# Patient Record
Sex: MALE | URBAN - METROPOLITAN AREA
[De-identification: names, ages, dates, MRNs, and addresses within clinical notes are randomized per-mention and may not be internally consistent; named-entity substitution may affect disease eponyms.]

---

## 2018-08-14 ENCOUNTER — APPOINTMENT (RX ONLY)
Dept: URBAN - METROPOLITAN AREA CLINIC 26 | Facility: CLINIC | Age: 43
Setting detail: DERMATOLOGY
End: 2018-08-14

## 2018-08-14 DIAGNOSIS — T07XXXA ABRASION OR FRICTION BURN OF OTHER, MULTIPLE, AND UNSPECIFIED SITES, WITHOUT MENTION OF INFECTION: ICD-10-CM

## 2018-08-14 DIAGNOSIS — L90.5 SCAR CONDITIONS AND FIBROSIS OF SKIN: ICD-10-CM

## 2018-08-14 PROBLEM — S00.30XA UNSPECIFIED SUPERFICIAL INJURY OF NOSE, INITIAL ENCOUNTER: Status: ACTIVE | Noted: 2018-08-14

## 2018-08-14 PROBLEM — L23.7 ALLERGIC CONTACT DERMATITIS DUE TO PLANTS, EXCEPT FOOD: Status: ACTIVE | Noted: 2018-08-14

## 2018-08-14 PROBLEM — L70.0 ACNE VULGARIS: Status: ACTIVE | Noted: 2018-08-14

## 2018-08-14 PROBLEM — M12.9 ARTHROPATHY, UNSPECIFIED: Status: ACTIVE | Noted: 2018-08-14

## 2018-08-14 PROCEDURE — ? COUNSELING

## 2018-08-14 PROCEDURE — 99201: CPT

## 2018-08-14 PROCEDURE — ? PRESCRIPTION SAMPLES PROVIDED

## 2018-08-14 PROCEDURE — ? RECOMMENDATIONS

## 2018-08-14 ASSESSMENT — LOCATION SIMPLE DESCRIPTION DERM
LOCATION SIMPLE: NOSE
LOCATION SIMPLE: LEFT FOREHEAD

## 2018-08-14 ASSESSMENT — LOCATION ZONE DERM
LOCATION ZONE: FACE
LOCATION ZONE: NOSE

## 2018-08-14 ASSESSMENT — LOCATION DETAILED DESCRIPTION DERM
LOCATION DETAILED: LEFT SUPERIOR FOREHEAD
LOCATION DETAILED: NASAL DORSUM
LOCATION DETAILED: NASAL ROOT

## 2018-08-14 NOTE — HPI: SKIN LESIONS
How Severe Is Your Skin Lesion?: mild
Have Your Skin Lesions Been Treated?: been treated
Is This A New Presentation, Or A Follow-Up?: Skin Lesions
Additional History: Pt scraped face against pool

## 2018-08-14 NOTE — PROCEDURE: RECOMMENDATIONS
Recommendations (Free Text): May treat with hydroquinone after lesions have fully healed
Recommendation Preamble: The following recommendations were made during the visit:
Detail Level: Detailed
Recommendations (Free Text): vaseline daily for several weeks

## 2018-09-17 ENCOUNTER — APPOINTMENT (RX ONLY)
Dept: URBAN - METROPOLITAN AREA CLINIC 26 | Facility: CLINIC | Age: 43
Setting detail: DERMATOLOGY
End: 2018-09-17

## 2018-09-17 DIAGNOSIS — L81.0 POSTINFLAMMATORY HYPERPIGMENTATION: ICD-10-CM | Status: IMPROVED

## 2018-09-17 DIAGNOSIS — L90.5 SCAR CONDITIONS AND FIBROSIS OF SKIN: ICD-10-CM

## 2018-09-17 PROCEDURE — 99213 OFFICE O/P EST LOW 20 MIN: CPT

## 2018-09-17 PROCEDURE — ? TREATMENT REGIMEN

## 2018-09-17 PROCEDURE — ? RECOMMENDATIONS

## 2018-09-17 ASSESSMENT — LOCATION SIMPLE DESCRIPTION DERM
LOCATION SIMPLE: NOSE
LOCATION SIMPLE: INFERIOR FOREHEAD

## 2018-09-17 ASSESSMENT — LOCATION ZONE DERM
LOCATION ZONE: FACE
LOCATION ZONE: NOSE

## 2018-09-17 ASSESSMENT — LOCATION DETAILED DESCRIPTION DERM
LOCATION DETAILED: NASAL ROOT
LOCATION DETAILED: INFERIOR MID FOREHEAD

## 2018-09-17 NOTE — PROCEDURE: RECOMMENDATIONS
Recommendations (Free Text): Continue serica qam
Detail Level: Simple
Recommendation Preamble: The following recommendations were made during the visit:

## 2025-04-16 ENCOUNTER — TELEPHONE (OUTPATIENT)
Dept: OCCUPATIONAL THERAPY | Facility: REHABILITATION | Age: 50
End: 2025-04-16

## 2025-04-17 ENCOUNTER — DOCUMENTATION (OUTPATIENT)
Dept: REHABILITATION | Facility: REHABILITATION | Age: 50
End: 2025-04-17

## 2025-04-17 NOTE — PROGRESS NOTES
"[facility]   REHAB PROGRAM  DOCUMENTATION ENCOUNTER    DATE:  2025    NAME:  Bon Swanson : 1975 AGE: 49 y.o. 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 His visual field defect is interrupting the horizontal meridian, pt can hence not be cleared for driving on repeat testing x2. Pt will be trying driving rehabilitation.\"    ASSESSMENT:  As client has been documented as not meeting [place] vision standards, he is otherwise not eligible for driving in [place] and therefore is not a candidate for pursuit of  rehabilitation in [place]. Eligible referrals must otherwise meet state vision standards to be considered eligible.    PLAN:  Client contacted to review referral and ineligibility at this time. Education provided on need to meet state vision standards in order to be eligible for  rehabilitation services. Client questioning need for driving evaluation if otherwise meeting standards, for which client was educated on risks associated with legally eligible vision loss and driving. Client was counseled on continued follow-up with his eye care providers regarding monitoring of his vision loss. Should he achieve the minimum standard for licensing, referral to a  rehabilitation program would at that time be appropriate. Client requesting alternate  rehabilitation providers to assist in evaluating him, for which he was re-educated on ineligibility for driving due to his present vision loss. Bon Sky verbalizes an understanding and agreement of next steps. Letter sent to referring provider for continuity of care " and clarification of services provided.      Gus Trimble MS, OTR/L, CDRS, CDI, CBIS  Occupational Therapist  Certified  Rehabilitation Specialist, Certified

## 2025-04-17 NOTE — LETTER
2025    Renetta Starr MD  [address], [address]  [place] [id number]    Re: Bon Swanson  : 1975  MRN: [id number]    Dear Dr. Starr,     Thank you for referring Mr. Bon Swanson to the [facility]. Unfortunately, due to documented vision loss not meeting of [place] vision standards, we are unable to evaluate Mr. Swanson at this time. In order to be eligible for driving restoration and  rehabilitation services, Mr. Swanson would first need to meet minimum visual eligibility requirements for licensure in [place]. Mr. Swanson was advised of his ineligibility to pursue services with our program and was encouraged to follow-up with you for continued monitoring of his condition. Should his condition improve and he be found to meet [place] vision standards,  rehabilitation services would then be advised to evaluate the extent of impact any remaining visual field impairment may have on his driving safety.    On basis of his present vision loss, formal medical reporting to [facility] is indicated at this time. Attached, please find an Initial Reporting Form you may use to make such a report. Completed forms can be faxed directly to [facility] at [phone].    Thank you again for your referral. Please feel free to contact me at [phone] with any questions regarding this case.    Sincerely,      Gus Trimble, MS, OTR/L, CDRS, CDI, CBIS  Occupational Therapist  Certified  Rehabilitation Specialist, Certified     Attachment: Initial Reporting Form